# Patient Record
Sex: FEMALE | Race: WHITE | Employment: FULL TIME | URBAN - METROPOLITAN AREA
[De-identification: names, ages, dates, MRNs, and addresses within clinical notes are randomized per-mention and may not be internally consistent; named-entity substitution may affect disease eponyms.]

---

## 2017-09-07 ENCOUNTER — HOSPITAL ENCOUNTER (OUTPATIENT)
Dept: SLEEP CENTER | Facility: CLINIC | Age: 47
Discharge: HOME | End: 2017-09-07
Payer: COMMERCIAL

## 2017-09-07 ENCOUNTER — TRANSCRIBE ORDERS (OUTPATIENT)
Dept: SLEEP CENTER | Facility: CLINIC | Age: 47
End: 2017-09-07

## 2017-09-07 DIAGNOSIS — G25.81 RESTLESS LEGS: ICD-10-CM

## 2017-09-07 DIAGNOSIS — G25.81 RESTLESS LEGS: Primary | ICD-10-CM

## 2018-08-17 ENCOUNTER — OFFICE VISIT (OUTPATIENT)
Dept: SLEEP CENTER | Facility: CLINIC | Age: 48
End: 2018-08-17
Payer: COMMERCIAL

## 2018-08-17 VITALS
WEIGHT: 158.6 LBS | HEIGHT: 64 IN | DIASTOLIC BLOOD PRESSURE: 78 MMHG | HEART RATE: 86 BPM | BODY MASS INDEX: 27.08 KG/M2 | SYSTOLIC BLOOD PRESSURE: 122 MMHG

## 2018-08-17 DIAGNOSIS — G25.81 RLS (RESTLESS LEGS SYNDROME): Primary | ICD-10-CM

## 2018-08-17 PROCEDURE — 99214 OFFICE O/P EST MOD 30 MIN: CPT | Performed by: PSYCHIATRY & NEUROLOGY

## 2018-08-17 RX ORDER — HYDROXYZINE HYDROCHLORIDE 25 MG/1
TABLET, FILM COATED ORAL
COMMUNITY

## 2018-08-17 RX ORDER — CLONAZEPAM 0.5 MG/1
TABLET ORAL
COMMUNITY

## 2018-08-17 RX ORDER — CLONAZEPAM 1 MG/1
TABLET ORAL
COMMUNITY
Start: 2018-06-27

## 2018-08-17 RX ORDER — DEXTROAMPHETAMINE SACCHARATE, AMPHETAMINE ASPARTATE, DEXTROAMPHETAMINE SULFATE AND AMPHETAMINE SULFATE 5; 5; 5; 5 MG/1; MG/1; MG/1; MG/1
TABLET ORAL
COMMUNITY

## 2018-08-17 RX ORDER — PRAMIPEXOLE DIHYDROCHLORIDE 0.75 MG/1
TABLET ORAL
COMMUNITY
End: 2018-08-17 | Stop reason: DRUGHIGH

## 2018-08-17 RX ORDER — LAMOTRIGINE 200 MG/1
TABLET ORAL
COMMUNITY
Start: 2018-06-19

## 2018-08-17 RX ORDER — DEXTROAMPHETAMINE SACCHARATE, AMPHETAMINE ASPARTATE MONOHYDRATE, DEXTROAMPHETAMINE SULFATE AND AMPHETAMINE SULFATE 2.5; 2.5; 2.5; 2.5 MG/1; MG/1; MG/1; MG/1
10 CAPSULE, EXTENDED RELEASE ORAL
COMMUNITY
Start: 2018-06-18 | End: 2018-09-16

## 2018-08-17 RX ORDER — CLONAZEPAM 1 MG/1
1.5 TABLET ORAL DAILY
COMMUNITY
Start: 2018-06-18 | End: 2018-09-16

## 2018-08-17 RX ORDER — PRAMIPEXOLE DIHYDROCHLORIDE 1 MG/1
1 TABLET ORAL
Qty: 60 TABLET | Refills: 6 | Status: SHIPPED | OUTPATIENT
Start: 2018-08-17

## 2018-08-17 NOTE — PROGRESS NOTES
Progress Note - [address] [facility] 51 y o  female   :1970, MRN: [id number]  2018          Follow Up Evaluation / Problem:     Restless Legs Syndrome    HPI: Lee Hogan is a 51y o  year old female  She had history of obstructive sleep apnea which has been controlled weight loss  She continues to have symptoms of restless legs syndrome  Current Outpatient Prescriptions:     amphetamine-dextroamphetamine (ADDERALL XR, 10MG,) 10 MG 24 hr capsule, Take 10 mg by mouth, Disp: , Rfl:     amphetamine-dextroamphetamine (ADDERALL, 20MG,) 20 mg tablet, Adderall 20 mg tablet, Disp: , Rfl:     clonazePAM (KlonoPIN) 0 5 mg tablet, clonazepam 0 5 mg tablet, Disp: , Rfl:     clonazePAM (KlonoPIN) 1 mg tablet, , Disp: , Rfl:     hydrOXYzine HCL (ATARAX) 25 mg tablet, hydroxyzine HCl 25 mg tablet, Disp: , Rfl:     lamoTRIgine (LaMICtal) 200 MG tablet, , Disp: , Rfl:     clonazePAM (KlonoPIN) 1 mg tablet, Take 1 5 mg by mouth daily, Disp: , Rfl:     pramipexole (MIRAPEX) 1 mg tablet, Take 1 tablet (1 mg total) by mouth daily at bedtime, Disp: 60 tablet, Rfl: 6    [place] Sleepiness Scale  Sitting and reading: High chance of dozing  Watching TV: Moderate chance of dozing  Sitting, inactive in a public place (e g  a theatre or a meeting): Would never doze  As a passenger in a car for an hour without a break: High chance of dozing  Lying down to rest in the afternoon when circumstances permit: Moderate chance of dozing  Sitting and talking to someone: Would never doze  Sitting quietly after a lunch without alcohol: Slight chance of dozing  In a car, while stopped for a few minutes in traffic: Would never doze  Total score: 11              Vitals:    18 0800   BP: 122/78   Pulse: 86   Weight: 71 9 kg (158 lb 9 6 oz)   Height: 5' 4" (1 626 m)       Body mass index is 27 22 kg/m²      Neck Circumference: 38       EPWORTH SLEEPINESS SCORE  Total score: 11      Past History Since Last Sleep Center Visit:     In April she suffered recurrent injury to her lower back  She had had symptoms of there for many years  In April she suffered recurrence of discomfort  Since that she has had chiropractic treatment as well as an epidural injection neither which have resulted in lasting results  She has been evaluated by her family physician as well as pain management  She apparently has a significant amount of lab work which needs to be gone over with her family physician  Her restless legs syndrome seems to be fairly well controlled using 2 mg of pramipexole at bedtime  In the past we attempted to decrease this to 1 5 mg, however, symptoms returned  She is now relatively well controlled on 2 mg  There is some difficulty with sleep maintenance and apparent mild sleep deprivation  Some of this may be due to discomfort some of it may be due to premenopausal symptoms      The review of systems and following portions of the patient's history were reviewed and updated as appropriate: allergies, current medications, past family history, past medical history, past social history, past surgical history, and problem list       OBJECTIVE    Review of Systems      Genitourinary none   Cardiology ankle/leg swelling   Gastrointestinal none   Neurology none   Constitutional claustrophobia   Integumentary none   Psychiatry anxiety and depression   Musculoskeletal back pain and sciatica   Pulmonary none   ENT none   Endocrine none   Hematological none       Physical Exam:     General Appearance:   Alert, cooperative, no distress, appears stated age overweight     Head:   Normocephalic, without obvious abnormality, atraumatic     Eyes:   PERRL, conjunctiva/corneas clear, EOM's intact          Nose:  Nares normal, septum midline, no drainage or sinus tenderness           Throat:  Lips, teeth and gums normal; tongue normal size and  shape and midline        Neck:  Supple, symmetrical, trachea midline, no adenopathy; Thyroid: No enlargement, tenderness or nodules; no carotid bruit or JVD     Lungs:      Clear to auscultation bilaterally, respirations unlabored     Heart:   Regular rate and rhythm, S1 and S2 normal, no murmur, rub or gallop       Extremities:  Extremities normal, atraumatic, no cyanosis or edema     Pulses:  2+ and symmetric all extremities     Skin:  Skin color, texture, turgor normal, no rashes or lesions       Neurologic:  CNII-XII intact  Normal strength, sensation throughout       ASSESSMENT / PLAN    1  RLS (restless legs syndrome)  pramipexole (MIRAPEX) 1 mg tablet           Counseling / Coordination of Care  Total clinic time spent today 25 minutes  Greater than 50% of total time was spent with the patient and / or family counseling and / or coordination of care  A description of the counseling / coordination of care:     Impressions, Diagnostic results, Prognosis, Instructions for management, Risks and benefits of treatment, Patient and family education and Risk factor reductions    The following instructions have been given to the patient today:    Patient Instructions   1  Increased pramipexole to 1 mg tablet 2 times at bedtime  2  Attempt to lose approximately 5 lb over the next 6 months  3  [facility] if Restless Legs Syndrome is a recur  4  Contact the [facility] if levels of daytime sleepiness worsen  5  Contact family physician for review of lab results  6  Have lab results sent to this office as well  7  No apparent need to restart nasal CPAP at present  8    Consider repeat studies if snoring returns for daytime sleepiness worsen significantly        DO Mimi Sharp 14

## 2018-08-17 NOTE — PATIENT INSTRUCTIONS
1   Increased pramipexole to 1 mg tablet 2 times at bedtime  2  Attempt to lose approximately 5 lb over the next 6 months  3  Contact [facility] if Restless Legs Syndrome is a recur  4  Contact [facility] if levels of daytime sleepiness worsen  5  Contact family physician for review of lab results  6  Have lab results sent to this office as well  7  No apparent need to restart nasal CPAP at present  8    Consider repeat studies if snoring returns for daytime sleepiness worsen significantly